# Patient Record
Sex: FEMALE | Race: WHITE | NOT HISPANIC OR LATINO | Employment: OTHER | URBAN - METROPOLITAN AREA
[De-identification: names, ages, dates, MRNs, and addresses within clinical notes are randomized per-mention and may not be internally consistent; named-entity substitution may affect disease eponyms.]

---

## 2024-06-13 NOTE — PROGRESS NOTES
Sequence of events      Tried to have the IUD removed in the office when it was removed the arms were left behind   Then scheduled for salpingectomy / hysteroscopy   Surgery in 5/6/24- for salpingectomy   Then she had hysteroscopy - IUD removed -  Thought at that time there was still a piece (arm) embedded and still present   They could not remove it   After had another sonogram   Sonogram 5/14/24- portion was left behind  6*2*5 mm in the lower segment left side.       Today reports. - no pain no abnormal bleeding  She only had the salpingectomy because she was not able to get another IUD    We went over the options     1  due nothing that the IUD is in the uterus and not outside the cavity so not causing any problems   2.  Attempt another Hysteroscopy - unlikely to be successful bc of the last one could not find it  3.  Hysterectomy - she is not interested in this time, she states that she wants to wait and see how things prorgess   If she has any concerns will come and let me know     Advised that the symptoms could be from her partner feeling like he was poked during relation that could indicate that the IUD is moving outside the cavity .     Kim Frey  (RN)  2019 16:[id number]

## 2024-06-14 ENCOUNTER — OFFICE VISIT (OUTPATIENT)
Dept: OBGYN CLINIC | Facility: MEDICAL CENTER | Age: 44
End: 2024-06-14
Payer: COMMERCIAL

## 2024-06-14 VITALS
HEIGHT: 62 IN | SYSTOLIC BLOOD PRESSURE: 130 MMHG | BODY MASS INDEX: 27.73 KG/M2 | WEIGHT: 150.7 LBS | DIASTOLIC BLOOD PRESSURE: 84 MMHG

## 2024-06-14 DIAGNOSIS — T83.32XA IUD MIGRATION, INITIAL ENCOUNTER: Primary | ICD-10-CM

## 2024-06-14 PROCEDURE — 99203 OFFICE O/P NEW LOW 30 MIN: CPT | Performed by: OBSTETRICS & GYNECOLOGY

## 2024-06-14 RX ORDER — POLYETHYLENE GLYCOL 3350 17 G/17G
POWDER, FOR SOLUTION ORAL
COMMUNITY
Start: 2024-05-14

## 2024-06-14 RX ORDER — ACETAMINOPHEN 325 MG/1
650 TABLET ORAL
COMMUNITY
Start: 2024-05-06

## 2024-06-14 RX ORDER — PROGESTERONE 100 MG/1
100 CAPSULE ORAL
COMMUNITY
Start: 2024-03-06

## 2024-06-14 RX ORDER — COPPER 313.4 MG/1
INTRAUTERINE DEVICE INTRAUTERINE
COMMUNITY
Start: 2024-02-20

## 2024-06-14 RX ORDER — TRAMADOL HYDROCHLORIDE 50 MG/1
TABLET ORAL
COMMUNITY
Start: 2024-03-19

## 2024-06-14 RX ORDER — METOCLOPRAMIDE 10 MG/1
10 TABLET ORAL EVERY 6 HOURS
COMMUNITY
Start: 2024-05-14

## 2024-06-14 RX ORDER — ONDANSETRON 4 MG/1
TABLET, FILM COATED ORAL
COMMUNITY
Start: 2024-05-06

## 2024-06-14 RX ORDER — CREAM BASE NO. 9
CREAM (GRAM) MISCELLANEOUS
COMMUNITY
Start: 2024-03-06

## 2024-06-14 RX ORDER — OXYCODONE HYDROCHLORIDE AND ACETAMINOPHEN 5; 325 MG/1; MG/1
TABLET ORAL
COMMUNITY
Start: 2024-05-06

## 2024-06-14 RX ORDER — IBUPROFEN 600 MG/1
600 TABLET ORAL EVERY 6 HOURS
COMMUNITY
Start: 2024-05-06